# Patient Record
Sex: MALE | Race: WHITE | NOT HISPANIC OR LATINO | Employment: FULL TIME | URBAN - METROPOLITAN AREA
[De-identification: names, ages, dates, MRNs, and addresses within clinical notes are randomized per-mention and may not be internally consistent; named-entity substitution may affect disease eponyms.]

---

## 2022-01-11 NOTE — PROGRESS NOTES
SUBJECTIVE:   CC: Josse Yang is an 51 year old male who presents for preventative health visit.       Patient has been advised of split billing requirements and indicates understanding: Yes     Healthy Habits:     Getting at least 3 servings of Calcium per day:  Yes    Bi-annual eye exam:  Yes    Dental care twice a year:  NO    Sleep apnea or symptoms of sleep apnea:  None    Diet:  Breakfast skipped    Frequency of exercise:  None    Taking medications regularly:  Yes    Medication side effects:  None    PHQ-2 Total Score: 4    Additional concerns today:  Yes  - Varicose veins : had US at outside clinic.     - bump on the heel left heel     - lumps on the back 5 years ago , come and go  ? Lipoma     - skin tag on the right upper eye lid     - [place] blood pressure       Wt Readings from Last 4 Encounters:   01/14/22 113.4 kg (250 lb)   07/19/16 107.5 kg (237 lb)   05/04/16 104.3 kg (230 lb)   04/27/16 104.3 kg (230 lb)         Preventive -     Immunization History   Administered Date(s) Administered     COVID-19,PF,Marvel 04/06/2021     Influenza,INJ,MDCK,PF,Quad >4yrs 10/17/2020     Tdap (Adacel,Boostrix) 01/30/2012       -STD screen: declines    - Colon CA screen: Colonoscopy, age 50-75 every 10 years or FIT every year or Cologuard every 3 years       - Prostate CA screen: Discussed controversy about screening.   No results found for: PSA    - Hep C screen: 1 in 30 infected so check if born between 6775-0592 once     -lipids screen: ordered     Diabetes screen: ordered             Answers for HPI/ROS submitted by the patient on 1/14/2022  If you checked off any problems, how difficult have these problems made it for you to do your work, take care of things at home, or get along with other people?: Somewhat difficult  PHQ9 TOTAL SCORE: 7    SH:    Marital status:   Kids: 4 step kids   Employment:  [employer]  Exercise: no   Tobacco: no  Etoh: too much 5 days per week 5-7 drinks per  night   Recreational drugs: no           Today's PHQ-2 Score:   PHQ-2 ( 1999 Pfizer) 1/14/2022   Q1: Little interest or pleasure in doing things 2   Q2: Feeling down, depressed or hopeless 2   PHQ-2 Score 4   Q1: Little interest or pleasure in doing things More than half the days   Q2: Feeling down, depressed or hopeless More than half the days   PHQ-2 Score 4       Abuse: Current or Past(Physical, Sexual or Emotional)- No  Do you feel safe in your environment? Yes    Have you ever done Advance Care Planning? (For example, a Health Directive, POLST, or a discussion with a medical provider or your loved ones about your wishes): No, advance care planning information given to patient to review.  Patient declined advance care planning discussion at this time.    Social History     Tobacco Use     Smoking status: Never Smoker     Smokeless tobacco: Never Used   Substance Use Topics     Alcohol use: No     If you drink alcohol do you typically have >3 drinks per day or >7 drinks per week? Yes      Alcohol Use 1/14/2022   Prescreen: >3 drinks/day or >7 drinks/week? Yes   AUDIT SCORE  29     AUDIT - Alcohol Use Disorders Identification Test - Reproduced from the World Health Organization Audit 2001 (Second Edition) 1/14/2022   1.  How often do you have a drink containing alcohol? 4 or more times a week   2.  How many drinks containing alcohol do you have on a typical day when you are drinking? 5 or 6   3.  How often do you have five or more drinks on one occasion? Weekly   4.  How often during the last year have you found that you were not able to stop drinking once you had started? Weekly   5.  How often during the last year have you failed to do what was normally expected of you because of drinking? Weekly   6.  How often during the last year have you needed a first drink in the morning to get yourself going after a heavy drinking session? Never   7.  How often during the last year have you had a feeling of guilt or  remorse after drinking? Weekly   8.  How often during the last year have you been unable to remember what happened the night before because of your drinking? Weekly   9.  Have you or someone else been injured because of your drinking? Yes, during the last year   10. Has a relative, friend, doctor or other health care worker been concerned about your drinking or suggested you cut down? Yes, during the last year   TOTAL SCORE 29       Last PSA: No results found for: PSA    Reviewed orders with patient. Reviewed health maintenance and updated orders accordingly - Yes  Lab work is in process  BP Readings from Last 3 Encounters:   01/14/22 (!) 148/100   07/19/16 (!) 138/96   04/27/16 140/87    Wt Readings from Last 3 Encounters:   01/14/22 113.4 kg (250 lb)   07/19/16 107.5 kg (237 lb)   05/04/16 104.3 kg (230 lb)                  Patient Active Problem List   Diagnosis     CARDIOVASCULAR SCREENING; LDL GOAL LESS THAN 160     Alcohol dependence (H)     Past Surgical History:   Procedure Laterality Date     KNEE SURGERY         Social History     Tobacco Use     Smoking status: Never Smoker     Smokeless tobacco: Never Used   Substance Use Topics     Alcohol use: Yes     Alcohol/week: 25.0 standard drinks     Types: 25 Standard drinks or equivalent per week     Family History   Adopted: Yes   Problem Relation Age of Onset     Lupus Mother          Current Outpatient Medications   Medication Sig Dispense Refill     amLODIPine (NORVASC) 5 MG tablet Take 1 tablet (5 mg) by mouth daily 90 tablet 1     No Known Allergies  No lab results found.     Reviewed and updated as needed this visit by clinical staff  Tobacco  Allergies  Meds   Med Hx  Surg Hx  Fam Hx  Soc Hx       Reviewed and updated as needed this visit by Provider   Allergies  Meds   Med Hx  Surg Hx  Fam Hx  Soc Hx      History reviewed. No pertinent past medical history.   Past Surgical History:   Procedure Laterality Date     KNEE SURGERY         Review  "of Systems   Constitutional: Negative for chills and fever.   HENT: Positive for congestion. Negative for ear pain, hearing loss and sore throat.    Eyes: Positive for visual disturbance. Negative for pain.   Respiratory: Negative for cough and shortness of breath.    Cardiovascular: Negative for chest pain, palpitations and peripheral edema.   Gastrointestinal: Positive for diarrhea. Negative for abdominal pain, constipation, heartburn, hematochezia and nausea.   Genitourinary: Negative for dysuria, frequency, genital sores, hematuria and urgency.   Musculoskeletal: Negative for arthralgias, joint swelling and myalgias.   Skin: Negative for rash.   Neurological: Negative for dizziness, weakness, headaches and paresthesias.   Psychiatric/Behavioral: Positive for mood changes. The patient is nervous/anxious.          OBJECTIVE:   BP (!) 155/103 (BP Location: Left arm, Patient Position: Sitting, Cuff Size: Adult Large)   Pulse 75   Temp 97  F (36.1  C) (Tympanic)   Ht 1.905 m (6' 3\")   Wt 113.4 kg (250 lb)   SpO2 97%   BMI 31.25 kg/m      Physical Exam  Vitals and nursing note reviewed.   Constitutional:       General: He is not in acute distress.     Appearance: Normal appearance. He is not ill-appearing, toxic-appearing or diaphoretic.   HENT:      Head: Normocephalic and atraumatic.   Cardiovascular:      Rate and Rhythm: Normal rate and regular rhythm.      Pulses: Normal pulses.      Heart sounds: Normal heart sounds. No murmur heard.  No friction rub. No gallop.    Pulmonary:      Effort: Pulmonary effort is normal. No respiratory distress.      Breath sounds: Normal breath sounds. No stridor. No wheezing, rhonchi or rales.   Chest:      Chest wall: No tenderness.   Musculoskeletal:      Cervical back: Normal range of motion.        Back:    Skin:     Capillary Refill: Capillary refill takes less than 2 seconds.   Neurological:      Mental Status: He is alert.               ASSESSMENT/PLAN:   (Z00.00) " "Routine general medical examination at a health care facility  (primary encounter diagnosis)  Comment: Preventive care reviewed and updated   Plan: Lipid panel reflex to direct LDL Fasting,         Comprehensive metabolic panel (BMP + Alb, Alk         Phos, ALT, AST, Total. Bili, TP)    (I10) Benign essential hypertension  Comment: New diagnosis.  Blood pressure has been elevated over the past few years.  Will start Norvasc  Plan: amLODIPine (NORVASC) 5 MG tablet        Return in 4 weeks for blood pressure check    (F10.10) Alcohol abuse  Comment: Patient has been having a problem with alcohol.  Referral to addiction medicine  Plan: Adult Mental Health Referral            (Z23) High priority for 2019-nCoV vaccine  Comment:   Plan: COVID-19,PF,PFIZER (12+ Yrs PURPLE LABEL)            (Z12.11) Screen for colon cancer  Comment:   Plan: Adult Gastro Ref - Procedure Only            (Z11.4) Screening for HIV (human immunodeficiency virus)  Comment:   Plan: HIV Antigen Antibody Combo            (Z11.59) Need for hepatitis C screening test  Comment:   Plan: Hepatitis C Screen Reflex to HCV RNA Quant and         Genotype            (Z12.5) Screening for prostate cancer  Comment:   Plan: PSA, screen            (Z13.1) Screening for diabetes mellitus  Comment:   Plan: Hemoglobin A1c              Patient has been advised of split billing requirements and indicates understanding: Yes  COUNSELING:   Reviewed preventive health counseling, as reflected in patient instructions       Regular exercise       Healthy diet/nutrition       Alcohol Use        Syphilis screening for high risk patients        HIV screeninx in teen years, 1x in adult years, and at intervals if high risk       Colon cancer screening    Estimated body mass index is 31.25 kg/m  as calculated from the following:    Height as of this encounter: 1.905 m (6' 3\").    Weight as of this encounter: 113.4 kg (250 lb).     Weight management plan: Discussed healthy " diet and exercise guidelines    He reports that he has never smoked. He has never used smokeless tobacco.      Counseling Resources:  ATP IV Guidelines  Pooled Cohorts Equation Calculator  FRAX Risk Assessment  ICSI Preventive Guidelines  Dietary Guidelines for Americans, 2010  USDA's MyPlate  ASA Prophylaxis  Lung CA Screening    Jenny Desir MD  [facility]

## 2022-01-14 ENCOUNTER — OFFICE VISIT (OUTPATIENT)
Dept: FAMILY MEDICINE | Facility: CLINIC | Age: 52
End: 2022-01-14
Payer: COMMERCIAL

## 2022-01-14 VITALS
WEIGHT: 250 LBS | TEMPERATURE: 97 F | DIASTOLIC BLOOD PRESSURE: 100 MMHG | SYSTOLIC BLOOD PRESSURE: 148 MMHG | OXYGEN SATURATION: 97 % | BODY MASS INDEX: 31.08 KG/M2 | HEART RATE: 75 BPM | HEIGHT: 75 IN

## 2022-01-14 DIAGNOSIS — Z12.11 SCREEN FOR COLON CANCER: ICD-10-CM

## 2022-01-14 DIAGNOSIS — Z13.1 SCREENING FOR DIABETES MELLITUS: ICD-10-CM

## 2022-01-14 DIAGNOSIS — F10.10 ALCOHOL ABUSE: ICD-10-CM

## 2022-01-14 DIAGNOSIS — Z12.5 SCREENING FOR PROSTATE CANCER: ICD-10-CM

## 2022-01-14 DIAGNOSIS — I10 BENIGN ESSENTIAL HYPERTENSION: ICD-10-CM

## 2022-01-14 DIAGNOSIS — Z00.00 ROUTINE GENERAL MEDICAL EXAMINATION AT A HEALTH CARE FACILITY: Primary | ICD-10-CM

## 2022-01-14 DIAGNOSIS — Z23 HIGH PRIORITY FOR 2019-NCOV VACCINE: ICD-10-CM

## 2022-01-14 DIAGNOSIS — Z11.4 SCREENING FOR HIV (HUMAN IMMUNODEFICIENCY VIRUS): ICD-10-CM

## 2022-01-14 DIAGNOSIS — Z11.59 NEED FOR HEPATITIS C SCREENING TEST: ICD-10-CM

## 2022-01-14 PROBLEM — F10.20 ALCOHOL DEPENDENCE (H): Status: ACTIVE | Noted: 2022-01-14

## 2022-01-14 LAB
ALBUMIN SERPL-MCNC: 4.5 G/DL (ref 3.4–5)
ALP SERPL-CCNC: 77 U/L (ref 40–150)
ALT SERPL W P-5'-P-CCNC: 130 U/L (ref 0–70)
ANION GAP SERPL CALCULATED.3IONS-SCNC: 6 MMOL/L (ref 3–14)
AST SERPL W P-5'-P-CCNC: 47 U/L (ref 0–45)
BILIRUB SERPL-MCNC: 0.5 MG/DL (ref 0.2–1.3)
BUN SERPL-MCNC: 21 MG/DL (ref 7–30)
CALCIUM SERPL-MCNC: 9 MG/DL (ref 8.5–10.1)
CHLORIDE BLD-SCNC: 108 MMOL/L (ref 94–109)
CHOLEST SERPL-MCNC: 180 MG/DL
CO2 SERPL-SCNC: 25 MMOL/L (ref 20–32)
CREAT SERPL-MCNC: 0.92 MG/DL (ref 0.66–1.25)
FASTING STATUS PATIENT QL REPORTED: YES
GFR SERPL CREATININE-BSD FRML MDRD: >90 ML/MIN/1.73M2
GLUCOSE BLD-MCNC: 103 MG/DL (ref 70–99)
HBA1C MFR BLD: 5.3 % (ref 0–5.6)
HDLC SERPL-MCNC: 41 MG/DL
LDLC SERPL CALC-MCNC: 108 MG/DL
NONHDLC SERPL-MCNC: 139 MG/DL
POTASSIUM BLD-SCNC: 4.4 MMOL/L (ref 3.4–5.3)
PROT SERPL-MCNC: 7.8 G/DL (ref 6.8–8.8)
PSA SERPL-MCNC: 1.48 UG/L (ref 0–4)
SODIUM SERPL-SCNC: 139 MMOL/L (ref 133–144)
TRIGL SERPL-MCNC: 154 MG/DL

## 2022-01-14 PROCEDURE — 87389 HIV-1 AG W/HIV-1&-2 AB AG IA: CPT | Performed by: FAMILY MEDICINE

## 2022-01-14 PROCEDURE — 80061 LIPID PANEL: CPT | Performed by: FAMILY MEDICINE

## 2022-01-14 PROCEDURE — 91300 COVID-19,PF,PFIZER (12+ YRS): CPT | Performed by: FAMILY MEDICINE

## 2022-01-14 PROCEDURE — 36415 COLL VENOUS BLD VENIPUNCTURE: CPT | Performed by: FAMILY MEDICINE

## 2022-01-14 PROCEDURE — 99213 OFFICE O/P EST LOW 20 MIN: CPT | Mod: 25 | Performed by: FAMILY MEDICINE

## 2022-01-14 PROCEDURE — 0004A COVID-19,PF,PFIZER (12+ YRS): CPT | Performed by: FAMILY MEDICINE

## 2022-01-14 PROCEDURE — 86803 HEPATITIS C AB TEST: CPT | Performed by: FAMILY MEDICINE

## 2022-01-14 PROCEDURE — G0103 PSA SCREENING: HCPCS | Performed by: FAMILY MEDICINE

## 2022-01-14 PROCEDURE — 99386 PREV VISIT NEW AGE 40-64: CPT | Mod: 25 | Performed by: FAMILY MEDICINE

## 2022-01-14 PROCEDURE — 90682 RIV4 VACC RECOMBINANT DNA IM: CPT | Performed by: FAMILY MEDICINE

## 2022-01-14 PROCEDURE — 80053 COMPREHEN METABOLIC PANEL: CPT | Performed by: FAMILY MEDICINE

## 2022-01-14 PROCEDURE — 90471 IMMUNIZATION ADMIN: CPT | Performed by: FAMILY MEDICINE

## 2022-01-14 PROCEDURE — 83036 HEMOGLOBIN GLYCOSYLATED A1C: CPT | Performed by: FAMILY MEDICINE

## 2022-01-14 RX ORDER — AMLODIPINE BESYLATE 5 MG/1
5 TABLET ORAL DAILY
Qty: 90 TABLET | Refills: 1 | Status: SHIPPED | OUTPATIENT
Start: 2022-01-14 | End: 2022-07-11

## 2022-01-14 ASSESSMENT — ENCOUNTER SYMPTOMS
CHILLS: 0
NAUSEA: 0
WEAKNESS: 0
DYSURIA: 0
PALPITATIONS: 0
HEMATURIA: 0
JOINT SWELLING: 0
HEMATOCHEZIA: 0
CONSTIPATION: 0
SHORTNESS OF BREATH: 0
MYALGIAS: 0
FEVER: 0
ARTHRALGIAS: 0
DIARRHEA: 1
FREQUENCY: 0
COUGH: 0
HEADACHES: 0
DIZZINESS: 0
HEARTBURN: 0
NERVOUS/ANXIOUS: 1
SORE THROAT: 0
EYE PAIN: 0
PARESTHESIAS: 0
ABDOMINAL PAIN: 0

## 2022-01-14 ASSESSMENT — PAIN SCALES - GENERAL: PAINLEVEL: NO PAIN (0)

## 2022-01-14 ASSESSMENT — PATIENT HEALTH QUESTIONNAIRE - PHQ9
SUM OF ALL RESPONSES TO PHQ QUESTIONS 1-9: 7
SUM OF ALL RESPONSES TO PHQ QUESTIONS 1-9: 7
10. IF YOU CHECKED OFF ANY PROBLEMS, HOW DIFFICULT HAVE THESE PROBLEMS MADE IT FOR YOU TO DO YOUR WORK, TAKE CARE OF THINGS AT HOME, OR GET ALONG WITH OTHER PEOPLE: SOMEWHAT DIFFICULT

## 2022-01-14 ASSESSMENT — MIFFLIN-ST. JEOR: SCORE: 2074.62

## 2022-01-15 ASSESSMENT — PATIENT HEALTH QUESTIONNAIRE - PHQ9: SUM OF ALL RESPONSES TO PHQ QUESTIONS 1-9: 7

## 2022-01-17 LAB
HCV AB SERPL QL IA: NONREACTIVE
HIV 1+2 AB+HIV1 P24 AG SERPL QL IA: NONREACTIVE

## 2022-01-24 ENCOUNTER — VIRTUAL VISIT (OUTPATIENT)
Dept: BEHAVIORAL HEALTH | Facility: CLINIC | Age: 52
End: 2022-01-24
Attending: FAMILY MEDICINE
Payer: COMMERCIAL

## 2022-01-24 DIAGNOSIS — F52.9 SEXUAL BEHAVIOR DISORDER: ICD-10-CM

## 2022-01-24 DIAGNOSIS — F10.10 ALCOHOL ABUSE: ICD-10-CM

## 2022-01-24 DIAGNOSIS — F32.A DEPRESSION, UNSPECIFIED DEPRESSION TYPE: Primary | ICD-10-CM

## 2022-01-24 DIAGNOSIS — F10.20 ALCOHOL USE DISORDER, SEVERE, DEPENDENCE (H): ICD-10-CM

## 2022-01-24 RX ORDER — NALTREXONE HYDROCHLORIDE 50 MG/1
50 TABLET, FILM COATED ORAL DAILY
Qty: 50 TABLET | Refills: 0 | Status: SHIPPED | OUTPATIENT
Start: 2022-01-24 | End: 2022-03-07

## 2022-01-24 ASSESSMENT — ANXIETY QUESTIONNAIRES
GAD7 TOTAL SCORE: 7
3. WORRYING TOO MUCH ABOUT DIFFERENT THINGS: SEVERAL DAYS
7. FEELING AFRAID AS IF SOMETHING AWFUL MIGHT HAPPEN: MORE THAN HALF THE DAYS
2. NOT BEING ABLE TO STOP OR CONTROL WORRYING: MORE THAN HALF THE DAYS
GAD7 TOTAL SCORE: 7
7. FEELING AFRAID AS IF SOMETHING AWFUL MIGHT HAPPEN: MORE THAN HALF THE DAYS
6. BECOMING EASILY ANNOYED OR IRRITABLE: SEVERAL DAYS
5. BEING SO RESTLESS THAT IT IS HARD TO SIT STILL: NOT AT ALL
GAD7 TOTAL SCORE: 7
4. TROUBLE RELAXING: NOT AT ALL
1. FEELING NERVOUS, ANXIOUS, OR ON EDGE: SEVERAL DAYS

## 2022-01-24 NOTE — PATIENT INSTRUCTIONS
It was so nice to meet you today, Josse!    1. Start Naltrexone 25 mg daily for 1 week, then take 50 mg daily.    2. Start sertraline (Zoloft) 50 mg daily.    3. One of our team members will contact you via phone to help you schedule a chemical health assessment, and for the compulsive sexual counseling group in the next 1-2 days. If you have not heard from them you may call [phone] to schedule. Both referrals have been placed.

## 2022-01-24 NOTE — NURSING NOTE
This video/telephone visit will be conducted via a call between you and your physician/provider. We have found that certain health care needs can be provided without the need for an in-person physical exam. This service lets us provide the care you need with a video /telephone conversation. If a prescription is necessary we can send it directly to your pharmacy. If lab work is needed we can place an order for that and you can then stop by our lab to have the test done at a later time.    Just as we bill insurance for in-person visits, we also bill insurance for video/telephone visits. If you have questions about your insurance coverage, we recommend that you speak with your insurance company.    Patient has given verbal consent for video/Telephone visit? yes    Patient would like video visit, please connect : [employer]  Reason for visit: ETOH overuse  Patient verified allergies, medications and pharmacy via [employer]    PHQ -9 score: 7 ( on 1/14/21 - pt reports no change)  AARON-7 score: 7    Patient states he  is ready for visit.    Kena Phoenix January 24, 2022 2:33 PM      Medications Phoned  to Pharmacy [] yes [x]no  Name of Pharmacist:  List Medications, including dose, quantity and instructions     Medications ordered this visit were e-scribed.  Verified by order class [x] yes  [] no  Depakote and Zoloft  Medication changes or discontinuations were communicated to patient's pharmacy: [] yes  [x] no     Dictation completed at time of chart check: [x] yes  [] no     I have checked the documentation for today s encounters and the above information has been reviewed and completed.        Kena Phoenix January 26, 2022 10:57 AM

## 2022-01-24 NOTE — PROGRESS NOTES
"[facility] ADDICTION MEDICINE  INITIAL VISIT                                                      SUBJECTIVE                                                    CC: Patient presents with:  Intake      Primary care provider: [facility]   Psychiatric provider or therapist:     Visit performed Virtual, via video  Video-Visit Details    Type of service:  Video Visit    Video Start Time: 2:30pm  Video End Time: 3:39 PM    Originating Location (pt. Location): Home    Distant Location (provider location):  [place] ADDICTION MEDICINE     Platform used for Video Visit: Faye      HPI:    Josse Yang is a 51 year old male with a history of Hypertension, depression, and alcohol dependence who presents for initial visit for addiction consultation and management referred by Dr Jenny Desir due to concerns for Alcohol Use Disorder (AUD)    Joses is seeking help with his alcohol use disorder and pornography. This is his first time seeking help. He states he began using alcohol at the age of 17, and started looking at pornography at the age of 8. He has been struggling with his mental health and alcohol use his whole life. He states he feels that depression has played a significant role his whole life.  Engaging in pornography helped him cope growing up, and is ongoing.  He states that he was adopted when he, and he grew up in a loving strict home. He began drinking heavily while in college. Drinking is a part of all his activities/hobbies and both alcohol and pornography are taking a toll on his marriage. He endorses drinking 6-12 hard seltzers and 3 shots of vodka 5 times weekly. He takes a \"break\" from alcohol after he has a day with a significant hangover to re-cooperate. He has spent a significant amount of time fine-tuning his alcohol use to avoid trouble/getting caught. He describes himself as a \"mean drunk\", and is verbally abusive to wife at times while intoxicated.  He has been working from home since " "March 2020, and since then he alcohol consumption and engaging in pornography as  has increased, and depression symptoms have worsened.  He also has elevated liver enzymes and new diagnosis of hypertension which are also motivators for him seeking treatment today. He denies any other substance use. He would like a referral for chemical health assessment and possible CD treatment, and is open to a trial of medications for cravings.     He has attended different groups in past for his sexual urges, but has no found them particularly helpful. States he often had urges to engage in behaviors after listening to others. He would like  a referral for therapy individual/group.    CD History:     SUBSTANCE(S)  OF CHOICE  - Alcohol started 17, drinks 6-12 seltzer drinks, 3 shots of vodka.    Stops for hangover. Drinks in early afternoon, evening.   Withdrawal symptoms - Physically sick, depression, anxiety.   IV drug use - No  Previous MAT - No  Previous detox/treatment - No  Current recovery activities: Reading book, you tube videos,   Longest period of sobriety - 3 days   Significant protective factors: Relationship, \"mean drunk\" verbally abusive at times when drinking  Medical complications from substance use- High blood pressure, elevated liver enzymes, not eating healthy            and not exercise.   History of overdose -No  Narcan currently available - No    Other Substance Use:  ALCOHOL  Per HPI  OPIATES:   None  KRATOM:  None  BENZODIAZEPINES   None  AMPHETAMINES/METHAMPHETAMINES  None  PRESCRIPTION STIMULANTS None  MARIJUANA  None  COCAINE/CRACK  None  HALLUCINOGENS   None  ANABOLIC STEROIDS  None   OTHER  (Gambling, shopping) Pornography  NICOTINE- No        Infectious Disease Screening:  Hep C: Non-reactive 1/14/2022  HIV: Non-reactive 1/14/2022      PAST PSYCHIATRIC HISTORY  Past diagnoses - Suffered from depression child childhood, adopted as a child. [place] parents strict. Her was class clown, life of party.  " Started drinking when he left [facility]. Pornography boosted his mood as an adolescent, engages daily. Cant stop once he starts, as well as alcohol.   Current or past psychiatrist: No  Current or past therapist: IN the past, counselor as a teenager for his anger issues. Relationship counseling. Pornography counseling. Tired the purity group at [employer], triggered at group.   Hospitalizations/commitment - No  Suicide Attempts - No  Psychotherapy/ECT/TMS -   Medication trials - Zoloft did not give it a fair shot. Took for 30 days.     PHQ-9 scores:  PHQ-9 SCORE 1/14/2022   PHQ-9 Total Score MyChart 7 (Mild depression)   PHQ-9 Total Score 7     AARON-7 scores:  AARON-7 SCORE 1/24/2022   Total Score 7 (mild anxiety)   Total Score 7       MEDICAL HISTORY:  Problem list, allergies, and histories reviewed & adjusted, as indicated.  Additional history: as documented     Patient Active Problem List    Diagnosis Date Noted     Alcohol abuse 01/24/2022     Priority: Medium     Depression, unspecified depression type 01/24/2022     Priority: Medium     Sexual behavior disorder 01/24/2022     Priority: Medium     Alcohol dependence (H) 01/14/2022     Priority: Medium     CARDIOVASCULAR SCREENING; LDL GOAL LESS THAN 160 04/27/2016     Priority: Medium       No past medical history on file.    Past Surgical History:   Procedure Laterality Date     KNEE SURGERY         Family History   Adopted: Yes   Problem Relation Age of Onset     Lupus Mother        SOCIAL HISTORY:    Living situation:  With wife   Single///partner    Children 4 children, 4 grandbabies   Support system: Wife, coworkers    Employment:    Barriers to Care (transportation, childcare, etc): None   Upcoming Court Date(s): No   Consent to Communicate? NA       ALLERGIES & CURRENT MEDICATIONS:  No Known Allergies    Current Outpatient Medications   Medication Sig Dispense Refill     naltrexone (DEPADE/REVIA) 50 MG tablet Take 1  tablet (50 mg) by mouth daily Take 1/2 tablet daily for 1 week, then take tablet daily. 50 tablet 0     sertraline (ZOLOFT) 50 MG tablet Take 1 tablet (50 mg) by mouth daily 30 tablet 0     amLODIPine (NORVASC) 5 MG tablet Take 1 tablet (5 mg) by mouth daily 90 tablet 1       REVIEW OF SYSTEMS:  General:  acute withdrawal symptoms.  No recent infections or fever  Eyes:  No vision concerns.  No double vision.    Resp: No coughing, wheezing or shortness of breath  CV: No chest pains or palpitations  GI: No nausea, vomiting, abdominal pain, diarrhea.  No constipation  : No urinary frequency or dysuria    Musculoskeletal: No significant muscle or joint pains other than as above.  No edema  Neurologic: No numbness, tingling, weakness, problems with balance or coordination  Psychiatric: No acute concerns other than as above. See mental status exam for more information.   Skin: No rashes or areas of acute infection    OBJECTIVE                                                      LABS:  Labs reviewed.  Pertinent data include:       No results found for any visits on 01/24/22.  AST   Date Value Ref Range Status   01/14/2022 47 (H) 0 - 45 U/L Final     ALT   Date Value Ref Range Status   01/14/2022 130 (H) 0 - 70 U/L Final       PHYSICAL EXAM:  There were no vitals taken for this visit.    GENERAL APPEARANCE: alert, comfortable appearing  EYES: Eyes grossly normal to inspection  HENT:  mouth without ulcers or lesions, nose no rhinorrhea  RESP: No cough, no resp distress  SKIN: no rashes, no jaundice, no obvious masses.   NEURO: Alert and oriented x4    MENTAL STATUS EXAM:  Appearance/Behavior:No appearant distress  Speech: Normal  Mood/Affect: normal affect  Insight: Adequate      [place] Board of Pharmacy Data Base Reviewed.  Is Consistent with patient reports and Epic records.    ASSESSMENT/PLAN                                                      DSM-5 Substance Use Disorder Criteria: At least two criteria must be  met within a twelve month period.    Impaired Control:    1. Substance is taken in larger amounts or over a longer period than was intended. Yes   2. There is a persistent desire or unsuccessful efforts to cut down or control use. Yes     3. A great deal of time is spent in activities necessary to obtain substance, use substance, or recover from its effects. Yes   4. Craving, or a strong desire or urge to use the substance. Yes      Social Impairment:    5. Recurrent substance use resulting in failure to fulfill major role obligations at work, school or home. Yes  6. Continued substance use despite having persistent or recurrent social or interpersonal problems caused or exacerbated by the effects of the substance. Yes    7.Important social, occupational, or recreational activities are given up or reduced because of the substance use.  No      Risky Use:   8. Recurrent substance use in situations in which it is physically hazardous. Yes    9. Substance use is continued despite knowledge of having a persistent or recurrent physical or psychological problem that is likely to have been caused or exacerbated by the substance. Yes      Pharmacological:  10. Tolerance, as defined by either of the following: Yes     a. A need for markedly increased amounts of the substance to achieve intoxication or desired effect.   b. A markedly diminished effect with continued use of the same amount of the substance.  11. Withdrawal, as manifested by either of the following: Yes     a. The characteristic withdrawal syndrome for the substance.   b. Substance (or a closely related substance) is taken to relieve or avoid withdrawal symptoms.     Mild: Presence of 2-3 symptoms  Moderate: Presence of 4-5 symptoms  Severe: Presence of 6 or more symptoms.    Specify if :  In early remission - no criteria met (except craving) for at least 3 months and less than 12 months  In sustained remission - no criteria met (except craving) for 12 months  or longer.    In a controlled environment - individual is in an environment where access to the substance is restricted.    Pt met 10 of 11 criteria for a Alcohol  use disorder, Severe    ASSESSMENT/PLAN:  Josse was seen today for intake.    Diagnoses and all orders for this visit:    Depression, unspecified depression type  Long history of depression symptoms since childhood. Did trial sertraline for 1 month years ago, but he states he does not think he felt differently.  He  reports drinking while taking medication. He denies any significant side effects of the medications. He is open to another trial of medication. Will start sertraline 50 mg daily.  -     sertraline (ZOLOFT) 50 MG tablet; Take 1 tablet (50 mg) by mouth daily    Alcohol Use Disorder, Severe  Referral for CD assessment and possible IOP placed. Naltrexone ordered. Discussed possibility of gabapentin. Would like to try the naltrexone at this time, consider gabapentin in future if cravings/use persist.    -     Adult Mental Health Referral  -     Adult Mental Health  Referral; Future  -     naltrexone (DEPADE/REVIA) 50 MG tablet; Take 1 tablet (50 mg) by mouth daily Take 1/2 tablet daily for 1 week, then take tablet daily.    Sexual behavior disorder  Engages in pornography daily, impacting his relationships. Referral placed for compulsive sexual therapy.    -     Adult Mental Health  Referral; Future       ENCOUNTER FOR LONG TERM USE OF HIGH RISK MEDICATION   High Risk Drug Monitoring?  YES   Drug being monitored: Naltrexone   Reason for drug: Alcohol Use Disorder   What is being monitored?: Dosage, Cravings, Triggers and side effects      Patient counseling completed today:    Addiction Services expectations were reviewed and a copy was provided to patient at the completion of today's visit.  The expectations addressed include; routine urine drug screens, frequency of office visits, cancellations/no-shows, and clinic contact  information.  The patient was notified that our clinic has 72 business hours to complete any forms and a minimum of 24 business hours to address any medication refill requests.  Questions regarding these expectations were answered and the patient verbalizes an understanding and acceptance of the above expectations.         Counseled the patient on the importance of having a recovery program in addition to medication assisted treatment (MAT).  Components of a recovery program include having some type of sober network, avoiding isolating, willingness to change, avoiding triggers, and managing cravings.      RTC:  1 week      Penelope Villalobos [facility] Addiction Medicine  [facility]  [phone]              Answers for HPI/ROS submitted by the patient on 1/24/2022  AARON 7 TOTAL SCORE: 7

## 2022-01-25 ASSESSMENT — ANXIETY QUESTIONNAIRES: GAD7 TOTAL SCORE: 7

## 2022-01-31 ENCOUNTER — VIRTUAL VISIT (OUTPATIENT)
Dept: BEHAVIORAL HEALTH | Facility: CLINIC | Age: 52
End: 2022-01-31
Payer: COMMERCIAL

## 2022-01-31 DIAGNOSIS — F10.21 ALCOHOL USE DISORDER, SEVERE, IN EARLY REMISSION (H): ICD-10-CM

## 2022-01-31 DIAGNOSIS — F32.A DEPRESSION, UNSPECIFIED DEPRESSION TYPE: ICD-10-CM

## 2022-01-31 DIAGNOSIS — F52.9 SEXUAL BEHAVIOR DISORDER: Primary | ICD-10-CM

## 2022-01-31 PROCEDURE — 99214 OFFICE O/P EST MOD 30 MIN: CPT | Mod: 95 | Performed by: NURSE PRACTITIONER

## 2022-01-31 RX ORDER — NALTREXONE HYDROCHLORIDE 50 MG/1
50 TABLET, FILM COATED ORAL DAILY
Refills: 0 | Status: CANCELLED | OUTPATIENT
Start: 2022-01-31

## 2022-01-31 NOTE — PROGRESS NOTES
[facility] Addiction Medicine    A/P                                                    ASSESSMENT/PLAN  Diagnoses and all orders for this visit:  Sexual behavior disorder  Engaged in behavior once in past week. Referral placed to compulsive sex therapy group, has not heard from behavioral access to schedule intake. Phone number given to Josse, he will call to get scheduled.   Depression, unspecified depression type  Started sertraline 50mg daily, mood is good.  Continue sertraline as ordered.   Alcohol use disorder, severe, in early remission (H)  -Started the naltrexone 25 mg daily. Did experience some side effects the first 3 days, they resolving. Will continue to take 25mg for another week, then increase to 50 mg daily. He denies cravings, and is enjoying waking up not feeling hung over.   -Josse will call behavioral access to schedule a chemical health assessment.  -Experienced withdrawal symptoms on day 4, headache, fatigue, decreased appetite, nausea. Symptoms are improving. Offered gabapentin for withdrawal, he declined.   - He recognizes the need to avoid people and places.    No orders of the defined types were placed in this encounter.      Problem list updated Jan 31, 2022   Problem   Alcohol Use Disorder, Severe, in Early Remission (H)         PDMP Review       Value Time User    State PDMP site checked  Yes 1/31/2022  3:06 PM Penelope Villalobos CNP          RTC  Return in about 1 week (around 2/7/2022), or Please schedule him at 3:30 pm for a follow up, for Follow up, using a video visit, with me.      Counseled the patient on the importance of having a recovery program in addition to medication to manage recovery.  Components include avoiding isolating, having willingness to change, avoiding triggers and managing cravings. Encouraged having some type of sober network and practicing honesty with trusted support person(s). Encouraged other services such as counseling, 12 step or other  "self-help organizations.        SUBJECTIVE                                                    Josse Yang is a 51 year old male who presents to clinic today for follow up    Visit performed Virtual, via video    Video-Visit Details    Type of service:  Video Visit    Video Start Time: 2:30 pm  Video End Time: 2:54 PM    Originating Location (pt. Location): Home    Distant Location (provider location):  [place] ADDICTION MEDICINE     Platform used for Video Visit: Faye Bergeron [facility] Details: 1/24/22  Josse Yang is a 51 year old male with a history of Hypertension, depression, and alcohol dependence who presents for initial visit for addiction consultation and management referred by Dr Jenny Desir due to concerns for Alcohol Use Disorder (AUD)     Josse is seeking help with his alcohol use disorder and pornography. This is his first time seeking help. He states he began using alcohol at the age of 17, and started looking at pornography at the age of 8. He has been struggling with his mental health and alcohol use his whole life. He states he feels that depression has played a significant role his whole life.  Engaging in pornography helped him cope growing up, and is ongoing.  He states that he was adopted when he, and he grew up in a loving strict home. He began drinking heavily while in college. Drinking is a part of all his activities/hobbies and both alcohol and pornography are taking a toll on his marriage. He endorses drinking 6-12 hard seltzers and 3 shots of vodka 5 times weekly. He takes a \"break\" from alcohol after he has a day with a significant hangover to re-cooperate. He has spent a significant amount of time fine-tuning his alcohol use to avoid trouble/getting caught. He describes himself as a \"mean drunk\", and is verbally abusive to wife at times while intoxicated.  He has been working from home since March 2020, and since then he alcohol consumption and engaging in pornography as  has " increased, and depression symptoms have worsened.  He also has elevated liver enzymes and new diagnosis of hypertension which are also motivators for him seeking treatment today. He denies any other substance use. He would like a referral for chemical health assessment and possible CD treatment, and is open to a trial of medications for cravings.      He has attended different groups in past for his sexual urges, but has no found them particularly helpful. States he often had urges to engage in behaviors after listening to others. He would like  a referral for therapy individual/group.  Depression, unspecified depression type  Long history of depression symptoms since childhood. Did trial sertraline for 1 month years ago, but he states he does not think he felt differently.  He  reports drinking while taking medication. He denies any significant side effects of the medications. He is open to another trial of medication. Will start sertraline 50 mg daily.  -     sertraline (ZOLOFT) 50 MG tablet; Take 1 tablet (50 mg) by mouth daily     Alcohol Use Disorder, Severe  Referral for CD assessment and possible IOP placed. Naltrexone ordered. Discussed possibility of gabapentin. Would like to try the naltrexone at this time, consider gabapentin in future if cravings/use persist.    -     Adult Mental Health Referral  -     Adult Mental Health  Referral; Future  -     naltrexone (DEPADE/REVIA) 50 MG tablet; Take 1 tablet (50 mg) by mouth daily Take 1/2 tablet daily for 1 week, then take tablet daily.     Sexual behavior disorder  Engages in pornography daily, impacting his relationships. Referral placed for compulsive sexual therapy.    -     Adult Mental Health  Referral; Future     TODAY'S VISIT  HPI Jan 31, 2022   Josse is being seen today for a follow up for the treatment of his  AUD.   - He reports that it has been easier than he thought, has not drank this past week.   -Went to wild game last Tuesday  "which was the last time he drank.   -Took naltrexone  on Tuesday and Wednesday 25 mg,  took a full dose on Thursday by accident amd xperienced some side effects. \"felt like flying high\"  -Denies cravings which is surprising to him. Many food cravings, comfort food.   -Sleeping more.   -Experienced some headache, nausea, decreased appetite. Feeling okay today.  -Enjoys waking up with out feeling hung over.   -Masturbation once in last week, stress is down.   -has not been contacted yet to schedule.   Has a lot of hope.   -Thinking about the future.  - -Substance use since last visit: 6 days sober.   -Cravings: No   -Triggers: Avoiding people, places  -Mood: Good. Fatigued  -Sleep: Sleeping well  -Spent time with wife working on kitchen.       OBJECTIVE                                                    PHYSICAL EXAM:  There were no vitals taken for this visit.    GENERAL: healthy, alert and no distress  EYES: Eyes grossly normal to inspection, PERRL and conjunctivae and sclerae normal  RESP: No respiratory distress  MENTAL STATUS EXAM  Appearance/Behavior: No appearant distress  Speech: Normal  Mood/Affect: normal affect  Insight: Adequate    LAB  No results found for any visits on 01/31/22.      HISTORY                                                    Problem list reviewed & adjusted, as indicated.  Patient Active Problem List   Diagnosis     CARDIOVASCULAR SCREENING; LDL GOAL LESS THAN 160     Alcohol dependence (H)     Alcohol use disorder, severe, in early remission (H)     Depression, unspecified depression type     Sexual behavior disorder         MEDICATION LIST (prior to visit)  amLODIPine (NORVASC) 5 MG tablet, Take 1 tablet (5 mg) by mouth daily  naltrexone (DEPADE/REVIA) 50 MG tablet, Take 1 tablet (50 mg) by mouth daily Take 1/2 tablet daily for 1 week, then take tablet daily.  sertraline (ZOLOFT) 50 MG tablet, Take 1 tablet (50 mg) by mouth daily    No current facility-administered medications on file " prior to visit.      MEDICATION LIST (after visit)  Current Outpatient Medications   Medication     amLODIPine (NORVASC) 5 MG tablet     naltrexone (DEPADE/REVIA) 50 MG tablet     sertraline (ZOLOFT) 50 MG tablet     No current facility-administered medications for this visit.         No Known Allergies    Additional MDM Details:  none    Penelope Villalobos [facility] Addiction Medicine  [facility]  [phone]

## 2022-01-31 NOTE — PROGRESS NOTES
This video/telephone visit will be conducted via a call between you and your physician/provider. We have found that certain health care needs can be provided without the need for an in-person physical exam. This service lets us provide the care you need with a video /telephone conversation. If a prescription is necessary we can send it directly to your pharmacy. If lab work is needed we can place an order for that and you can then stop by our lab to have the test done at a later time.    Just as we bill insurance for in-person visits, we also bill insurance for video/telephone visits. If you have questions about your insurance coverage, we recommend that you speak with your insurance company.    Patient has given verbal consent for video/Telephone visit? Yes    Patient would like video visit, please connect : [place], if unable to connect call [phone].  Reason for visit: Medication follow up   Patient verified allergies, medications and pharmacy via telephone.     AARON-7 scores:    AARON-7 SCORE 1/24/2022   Total Score 7 (mild anxiety)   Total Score 7       PHQ-9 scores:   PHQ-9 SCORE 1/14/2022   PHQ-9 Total Score Dannyhart 7 (Mild depression)   PHQ-9 Total Score 7     Patient states he  is ready for visit.  [place]  to be reviewed by provider.     Jazmin Tapia CMA January 31, 2022 2:07 PM

## 2022-01-31 NOTE — PATIENT INSTRUCTIONS
It was great to see you today, and hearing about your successes!    1. Continue to take Naltrexobe 25 mg for another week, then increase to 50 mg daily.    2. Call Behavioral access at [phone] to schedule a chemical health assessment and group intake.     See you in a week. Reach if needed with questions or concerns.

## 2022-02-11 ENCOUNTER — VIRTUAL VISIT (OUTPATIENT)
Dept: BEHAVIORAL HEALTH | Facility: CLINIC | Age: 52
End: 2022-02-11
Attending: FAMILY MEDICINE
Payer: COMMERCIAL

## 2022-02-11 VITALS — WEIGHT: 245 LBS | BODY MASS INDEX: 30.62 KG/M2

## 2022-02-11 DIAGNOSIS — F10.21 ALCOHOL USE DISORDER, SEVERE, IN EARLY REMISSION (H): Primary | ICD-10-CM

## 2022-02-11 DIAGNOSIS — F52.9 SEXUAL BEHAVIOR DISORDER: ICD-10-CM

## 2022-02-11 DIAGNOSIS — F32.A DEPRESSION, UNSPECIFIED DEPRESSION TYPE: ICD-10-CM

## 2022-02-11 PROCEDURE — 99215 OFFICE O/P EST HI 40 MIN: CPT | Mod: 95 | Performed by: NURSE PRACTITIONER

## 2022-02-11 NOTE — PROGRESS NOTES
[facility] Addiction Medicine    A/P                                                    ASSESSMENT/PLAN  Diagnoses and all orders for this visit:  Josse is doing well avoiding alcohol. He is not having any cravings. His mood is deflated from 2 weeks ago Reminded him that inearly recovery, mood vacillates between high and low moods. He is worried he is substituting his alcohol use with food, escaping with his IPAD, He has low motivation to to much of anything. States when he drank he was constantly doing projects around the house, and now he has no desire. Encouraged him to make  List of tasks he needs to get done both at work and home and slowly start doing tasks to avoid feeling overwhelmed and ultimately increase his anxity and depression. He plans to set an intention to do one task per day. He is napping during the day, and not sleeping well at night which may contribute to his feeling tired. Encouraged avoiding naps, and take melatonin at night.   Alcohol use disorder, severe, in early remission (H)  -Continue Naltrexone 50 mg daily. No refills needed.   Depression, unspecified depression type  -Continue sertraline 50 mg daily. Consider increasing dose to 100 mg if no improvement, or changing to bupropion for its energizing effects.   Sexual behavior disorder  -He is doing well, has look at pornography 3 times in 2 weeks, but really really has no urges.   -Continue Naltrexone.   No orders of the defined types were placed in this encounter.      Problem list updated Feb 11, 2022   No problems updated.      PDMP Review       Value Time User    State PDMP site checked  Yes 1/31/2022  3:06 PM Penelope Villalobos CNP          RTC  Return in about 2 weeks (around 2/25/2022), or 3:30pm patient aware, for Follow up, using a video visit, with me.      Counseled the patient on the importance of having a recovery program in addition to medication to manage recovery.  Components include avoiding isolating, having  "willingness to change, avoiding triggers and managing cravings. Encouraged having some type of sober network and practicing honesty with trusted support person(s). Encouraged other services such as counseling, 12 step or other self-help organizations.      Opioid warning reviewed.  Risk of overdose following a period of abstinence due to decrease tolerance was discussed including risk of death.  Strongly recommended abstain from alcohol, benzodiazepines, THC, opioids and other drugs of abuse.  Increased risk of return to opioid use after use of these substances discussed.  Increased risk of overdose/death with use of other substances particularly benzodiazepines/alcohol reviewed.        SUBJECTIVE                                                    Josse Yang is a 51 year old male who presents to clinic today for follow up    Visit performed Virtual, via video    Video-Visit Details    Type of service:  Video Visit    Video Start Time: 3:30pm  Video End Time: 4:23 PM    Originating Location (pt. Location): Home    Distant Location (provider location):  [facility] [employer] [facility]     Platform used for Video Visit: Faye Bergeron HPI Details: 1/31/22  Josse is being seen today for a follow up for the treatment of his  AUD.   - He reports that it has been easier than he thought, has not drank this past week.   -Went to wild game last Tuesday which was the last time he drank.   -Took naltrexone  on Tuesday and Wednesday 25 mg,  took a full dose on Thursday by accident amd xperienced some side effects. \"felt like flying high\"  -Denies cravings which is surprising to him. Many food cravings, comfort food.   -Sleeping more.   -Experienced some headache, nausea, decreased appetite. Feeling okay today.  -Enjoys waking up with out feeling hung over.   -Masturbation once in last week, stress is down.   -has not been contacted yet to schedule.   Has a lot of hope.   -Thinking about the future.  - -Substance use since last " visit: 6 days sober.   -Cravings: No   -Triggers: Avoiding people, places  -Mood: Good. Fatigued  -Sleep: Sleeping well  -Spent time with wife working on kitchen.      Sexual behavior disorder  Engaged in behavior once in past week. Referral placed to compulsive sex therapy group, has not heard from behavioral access to schedule intake. Phone number given to Josse, he will call to get scheduled.   Depression, unspecified depression type  Started sertraline 50mg daily, mood is good.  Continue sertraline as ordered.   Alcohol use disorder, severe, in early remission (H)  -Started the naltrexone 25 mg daily. Did experience some side effects the first 3 days, they resolving. Will continue to take 25mg for another week, then increase to 50 mg daily. He denies cravings, and is enjoying waking up not feeling hung over.   -Josse will call behavioral access to schedule a chemical health assessment.  -Experienced withdrawal symptoms on day 4, headache, fatigue, decreased appetite, nausea. Symptoms are improving. Offered gabapentin for withdrawal, he declined.   - He recognizes the need to avoid people and places.    No orders of the defined types were placed in this encounter.    TODAY'S VISIT  HPI Feb 11, 2022   Josse is 52 years old with history for hypertension, anxiety, and depression is being seen today for a follow up for AUD. He is taking sertraline 50 mg daily and naltrexone 50 mg daily.     - He is still not drinking. He denies cravings. States he has no desire to drink. He is surprised by his lack of desire, and questions why he doesn't.   -He is experiencing low motivation, and feeling tired much of the day. Takes naps.   -When he gets off of work he does not want to do anything. Wife is feeling frustrated.    -Promoted to manager role, is feeling overwhelmed and stressed. Working long hours  -Feels overwhelmed by many tasks he needs to complete. He procrastinates doing tasks then becomes overwhelmed.   - food  "cravings  - escaping with IPAD for 3 hours in evening. Is worries he is replacing the alcohol with other things due to his \"addictive nature\".   -pornography 3 times urges decreasing and alcohol urge decrease.    -Is worried about going to [place] in April.   -Sleeping not well, encouraged melatonin, avoid naps during the day.           OBJECTIVE                                                    PHYSICAL EXAM:  Wt 111.1 kg (245 lb)   BMI 30.62 kg/m      GENERAL: healthy, alert and no distress  EYES: Eyes grossly normal to inspection, PERRL and conjunctivae and sclerae normal  RESP: No respiratory distress  MENTAL STATUS EXAM  Appearance/Behavior: No appearant distress  Speech: Normal  Mood/Affect: normal affect  Insight: Adequate    LAB  No results found for any visits on 02/11/22.      HISTORY                                                    Problem list reviewed & adjusted, as indicated.  Patient Active Problem List   Diagnosis     CARDIOVASCULAR SCREENING; LDL GOAL LESS THAN 160     Alcohol dependence (H)     Alcohol use disorder, severe, in early remission (H)     Depression, unspecified depression type     Sexual behavior disorder         MEDICATION LIST (prior to visit)  amLODIPine (NORVASC) 5 MG tablet, Take 1 tablet (5 mg) by mouth daily  naltrexone (DEPADE/REVIA) 50 MG tablet, Take 1 tablet (50 mg) by mouth daily Take 1/2 tablet daily for 1 week, then take tablet daily.  sertraline (ZOLOFT) 50 MG tablet, Take 1 tablet (50 mg) by mouth daily    No current facility-administered medications on file prior to visit.      MEDICATION LIST (after visit)  Current Outpatient Medications   Medication     amLODIPine (NORVASC) 5 MG tablet     naltrexone (DEPADE/REVIA) 50 MG tablet     sertraline (ZOLOFT) 50 MG tablet     No current facility-administered medications for this visit.         No Known Allergies    Additional MDM Details:  none    Penelope Villalobos CNP  [facility]  [facility]  [phone]

## 2022-02-11 NOTE — NURSING NOTE
This video/telephone visit will be conducted via a call between you and your physician/provider. We have found that certain health care needs can be provided without the need for an in-person physical exam. This service lets us provide the care you need with a video /telephone conversation. If a prescription is necessary we can send it directly to your pharmacy. If lab work is needed we can place an order for that and you can then stop by our lab to have the test done at a later time.    Just as we bill insurance for in-person visits, we also bill insurance for video/telephone visits. If you have questions about your insurance coverage, we recommend that you speak with your insurance company.    Patient has given verbal consent for video/Telephone visit? yes    Patient would like video visit, please connect : floridalma  Reason for visit: follow up  Patient verified allergies, medications and pharmacy via phone.     AARON-7 scores:    AARON-7 SCORE 1/24/2022 2/8/2022   Total Score 7 (mild anxiety) 5 (mild anxiety)   Total Score 7 5       PHQ-9 scores:   PHQ-9 SCORE 1/14/2022 2/8/2022   PHQ[facility]9 [facility] (Mild depression) 6 (Mild depression)   PHQ-9 Total Score 7 6       Patient states he is ready for visit. Declined PHQ/AARON today    Mac Grier MA February 11, 2022 3:26 PM

## 2022-03-07 ENCOUNTER — MYC REFILL (OUTPATIENT)
Dept: BEHAVIORAL HEALTH | Facility: CLINIC | Age: 52
End: 2022-03-07
Payer: COMMERCIAL

## 2022-03-07 DIAGNOSIS — F10.10 ALCOHOL ABUSE: ICD-10-CM

## 2022-03-07 RX ORDER — NALTREXONE HYDROCHLORIDE 50 MG/1
50 TABLET, FILM COATED ORAL DAILY
Qty: 50 TABLET | Refills: 0 | Status: SHIPPED | OUTPATIENT
Start: 2022-03-07 | End: 2022-04-19

## 2022-04-19 ENCOUNTER — MYC REFILL (OUTPATIENT)
Dept: BEHAVIORAL HEALTH | Facility: CLINIC | Age: 52
End: 2022-04-19
Payer: COMMERCIAL

## 2022-04-19 DIAGNOSIS — F10.10 ALCOHOL ABUSE: ICD-10-CM

## 2022-04-20 RX ORDER — NALTREXONE HYDROCHLORIDE 50 MG/1
50 TABLET, FILM COATED ORAL DAILY
Qty: 50 TABLET | Refills: 0 | Status: SHIPPED | OUTPATIENT
Start: 2022-04-20 | End: 2022-06-08

## 2022-04-20 NOTE — TELEPHONE ENCOUNTER
Requested Prescriptions   Pending Prescriptions Disp Refills     naltrexone (DEPADE/REVIA) 50 MG tablet 50 tablet 0     Sig: Take 1 tablet (50 mg) by mouth daily Take 1/2 tablet daily for 1 week, then take tablet daily.       There is no refill protocol information for this order        Routing refill request to provider for review/approval because:  Drug not on the [facility] refill protocol     Patient comment: Hello!  I am leaving on vacation this Saturday and need a refill.  Things are going good - still going strong without a drop :)    Thanks,  ANIA Castillo  [facility]

## 2022-05-09 ENCOUNTER — TRANSFERRED RECORDS (OUTPATIENT)
Dept: HEALTH INFORMATION MANAGEMENT | Facility: CLINIC | Age: 52
End: 2022-05-09
Payer: COMMERCIAL

## 2022-05-16 ENCOUNTER — NURSE TRIAGE (OUTPATIENT)
Dept: NURSING | Facility: CLINIC | Age: 52
End: 2022-05-16
Payer: COMMERCIAL

## 2022-05-16 NOTE — TELEPHONE ENCOUNTER
Caller is wife; reports patient had colonoscopy last week with 8 polyps removed; since than has had  Diarrhea and abdominal pain and now passed  BRB in stool   Advised to proceed to ED   Wife understands and will comply   Clair PRIETO       Reason for Disposition    [1] Constant abdominal pain AND [2] present > 2 hours    [1] MODERATE rectal bleeding (small blood clots, passing blood without stool, or toilet water turns red) AND [2] more than once a day    Protocols used: RECTAL BLEEDING-A-AH

## 2022-05-17 ENCOUNTER — OFFICE VISIT (OUTPATIENT)
Dept: FAMILY MEDICINE | Facility: CLINIC | Age: 52
End: 2022-05-17
Payer: COMMERCIAL

## 2022-05-17 VITALS
BODY MASS INDEX: 29.75 KG/M2 | RESPIRATION RATE: 20 BRPM | OXYGEN SATURATION: 97 % | DIASTOLIC BLOOD PRESSURE: 60 MMHG | WEIGHT: 238 LBS | HEART RATE: 77 BPM | SYSTOLIC BLOOD PRESSURE: 118 MMHG | TEMPERATURE: 97.3 F

## 2022-05-17 DIAGNOSIS — R19.7 DIARRHEA OF PRESUMED INFECTIOUS ORIGIN: Primary | ICD-10-CM

## 2022-05-17 DIAGNOSIS — K92.1 BLOOD IN STOOL: ICD-10-CM

## 2022-05-17 LAB — C DIFF TOX B STL QL: NEGATIVE

## 2022-05-17 PROCEDURE — 87493 C DIFF AMPLIFIED PROBE: CPT | Performed by: PHYSICIAN ASSISTANT

## 2022-05-17 PROCEDURE — 99213 OFFICE O/P EST LOW 20 MIN: CPT | Performed by: PHYSICIAN ASSISTANT

## 2022-05-17 PROCEDURE — 87506 IADNA-DNA/RNA PROBE TQ 6-11: CPT | Mod: 59 | Performed by: PHYSICIAN ASSISTANT

## 2022-05-17 RX ORDER — LOPERAMIDE HYDROCHLORIDE 2 MG/1
2 TABLET ORAL 4 TIMES DAILY PRN
Qty: 30 TABLET | Refills: 0 | Status: SHIPPED | OUTPATIENT
Start: 2022-05-17

## 2022-05-17 ASSESSMENT — PAIN SCALES - GENERAL: PAINLEVEL: SEVERE PAIN (6)

## 2022-05-17 NOTE — PROGRESS NOTES
Assessment & Plan     Diarrhea of presumed infectious origin  Blood in stool  Patient had colonoscopy 1 week ago through out side clinic. 9 polyps biopsied with neg pathology. Since the procedure he has been struggling with loose stools and reduced appetite. One episode of BRBPR on 05/16. He was given reassurance about low concern for perforation given normal vitals and exam. He was educated on the importance of bland diet with avoidance of sugars, grease or foods with lots of flavoring/spice. In addition, advised against consumption of soda or alcohol. Patient will complete stool culture to rule out infection. He was given loperamide to use as needed to better manage symptoms. Educational information attached to the AVS. If he does not improve as expected can consider CT abdomen.   - Clostridium difficile Toxin B PCR; Future  - Enteric Bacteria and Virus Panel by GARRY Stool; Future  - Clostridium difficile Toxin B PCR  - Enteric Bacteria and Virus Panel by GARRY Stool      Return in about 8 months (around 1/17/2023) for Return for scheduled annual checkup with PCP.    SYLVIA Nelson [facility]    Reina Loaiza is a 52 year old who presents for the following health issues     History of Present Illness       Reason for visit:  Blood in pancho  Symptom onset:  1-3 days ago  Symptoms include:  Blood in stool upset stomach diarrhea  Symptom intensity:  Moderate  Symptom progression:  Staying the same  Had these symptoms before:  No  What makes it worse:  Food  What makes it better:  Rest bathroom    He eats 0-1 servings of fruits and vegetables daily.He consumes 1 sweetened beverage(s) daily.He exercises with enough effort to increase his heart rate 20 to 29 minutes per day.  He exercises with enough effort to increase his heart rate 3 or less days per week. He is missing 1 dose(s) of medications per week.       Patient is a 52 year old male who presents today with 5 days of  diarrhea, low appetite and fatigue. He informs me that he had his first colonoscopy last week, through the [place] [facility]. He showed me the letter that they sent him which indicated removal of 9 polyps, pathology unremarkable. He says that since the colonoscopy he has been experiencing 3-4 or more loose stools each day. He also says that he has found that anything he eats will trigger a bowel movement shortly after. As such his appetite has been down. On Thursday, 05/12, he noticed that he was more fatigued and slept throughout the afternoon/evening. This is unusual for him. Over the weekend his low appetite persisted as well as the loose stools. He began to feel better by the end of Sunday and went to [employer], but said that he symptoms quickly returned after eating a small amount of food. Yesterday, 05/16, he had a loose, watery stool with BRBPR. This has not occurred with subsequent stools since. Associated symptoms include night sweats, off/on chills and nausea. He says that he has been snacking on candy, soda and similar junk food throughout this week. Denies dizziness/light headedness, fever, abdominal pain, bruising, weakness or pain with bowel movements.     Review of Systems   Constitutional, HEENT, cardiovascular, pulmonary, gi and gu systems are negative, except as otherwise noted.      Objective    /60   Pulse 77   Temp 97.3  F (36.3  C) (Temporal)   Resp 20   Wt 108 kg (238 lb)   SpO2 97%   BMI 29.75 kg/m    Body mass index is 29.75 kg/m .  Physical Exam   GENERAL: healthy, alert and no distress  NECK: no adenopathy, no asymmetry, masses, or scars and thyroid normal to palpation  RESP: lungs clear to auscultation - no rales, rhonchi or wheezes  CV: regular rate and rhythm, normal S1 S2, no S3 or S4, no murmur, click or rub, no peripheral edema and peripheral pulses strong  ABDOMEN: soft, nontender to palpation in all quadrants, no hepatosplenomegaly, no masses and bowel  sounds normal  MS: no gross musculoskeletal defects noted, no edema  PSYCH: mentation appears normal, affect normal/bright

## 2022-06-05 LAB
C COLI+JEJUNI+LARI FUSA STL QL NAA+PROBE: NOT DETECTED
EC STX1 GENE STL QL NAA+PROBE: NOT DETECTED
EC STX2 GENE STL QL NAA+PROBE: NOT DETECTED
NOROV GI+II ORF1-ORF2 JNC STL QL NAA+PR: NOT DETECTED
RVA NSP5 STL QL NAA+PROBE: NOT DETECTED
SALMONELLA SP RPOD STL QL NAA+PROBE: DETECTED
SHIGELLA SP+EIEC IPAH STL QL NAA+PROBE: NOT DETECTED
V CHOL+PARA RFBL+TRKH+TNAA STL QL NAA+PR: NOT DETECTED
Y ENTERO RECN STL QL NAA+PROBE: NOT DETECTED

## 2022-06-08 ENCOUNTER — MYC REFILL (OUTPATIENT)
Dept: BEHAVIORAL HEALTH | Facility: CLINIC | Age: 52
End: 2022-06-08
Payer: COMMERCIAL

## 2022-06-08 DIAGNOSIS — F10.10 ALCOHOL ABUSE: ICD-10-CM

## 2022-06-09 RX ORDER — NALTREXONE HYDROCHLORIDE 50 MG/1
50 TABLET, FILM COATED ORAL DAILY
Qty: 90 TABLET | Refills: 0 | Status: SHIPPED | OUTPATIENT
Start: 2022-06-09 | End: 2022-10-25

## 2022-07-11 DIAGNOSIS — I10 BENIGN ESSENTIAL HYPERTENSION: ICD-10-CM

## 2022-07-11 RX ORDER — AMLODIPINE BESYLATE 5 MG/1
5 TABLET ORAL DAILY
Qty: 90 TABLET | Refills: 1 | Status: SHIPPED | OUTPATIENT
Start: 2022-07-11 | End: 2023-01-04

## 2022-09-11 ENCOUNTER — HEALTH MAINTENANCE LETTER (OUTPATIENT)
Age: 52
End: 2022-09-11

## 2022-10-25 ENCOUNTER — MYC REFILL (OUTPATIENT)
Dept: BEHAVIORAL HEALTH | Facility: CLINIC | Age: 52
End: 2022-10-25

## 2022-10-25 DIAGNOSIS — F10.10 ALCOHOL ABUSE: ICD-10-CM

## 2022-10-26 NOTE — TELEPHONE ENCOUNTER
Patient Comment: Hi Penelope - it's been awhile.  I'd like to re-connect and would also like a refill of 90 tabs if possible.  I've been taking this off and on but I think it's been working when I do take it.  PS - but I am out of Sertraline which I also think is working . . . when I take it, but concerned now since I'm out. Lets connect please - thanks!  Josse    Date of Last Office Visit: 2/11/22  Date of Next Office Visit: NONE -routing to scheduling  No shows since last visit: 0  Cancellations since last visit: 0    Medication requested: naltrexone (DEPADE/REVIA) 50 MG tablet Date last ordered: 6/9/22 Qty: 90 Refills: 0     Review of [place] ?: NA  Lapse in medication adherence greater than 5 days?: Yes   If yes, call patient and gather details: Per patient comment listed above  Medication refill request verified as identical to current order?: yes  Result of Last DAM, VPA, Li+ Level, CBC, or Carbamazepine Level (at or since last visit): N/A    Last visit treatment plan: Alcohol use disorder, severe, in early remission (H)  -Continue Naltrexone 50 mg daily. No refills needed.   Depression, unspecified depression type  -Continue sertraline 50 mg daily. Consider increasing dose to 100 mg if no improvement, or changing to bupropion for its energizing effects.   Sexual behavior disorder  -He is doing well, has look at pornography 3 times in 2 weeks, but really really has no urges.   -Continue Naltrexone.   No orders of the defined types were placed in this encounter    RTC  Return in about 2 weeks (around 2/25/2022), or 3:30pm patient aware, for Follow up, using a video visit, with me.       []Medication refilled per  Medication Refill in Ambulatory Care  policy.  [x]Medication unable to be refilled by RN due to criteria not met as indicated below:    []Eligibility - not seen in the last year   []Supervision - no future appointment   []Compliance - no shows, cancellations or lapse in therapy   []Verification - order  discrepancy   []Controlled medication   [x]Medication not included in policy   []90-day supply request   []Other

## 2022-11-03 RX ORDER — NALTREXONE HYDROCHLORIDE 50 MG/1
50 TABLET, FILM COATED ORAL DAILY
Qty: 90 TABLET | Refills: 0 | Status: SHIPPED | OUTPATIENT
Start: 2022-11-03

## 2023-05-06 ENCOUNTER — HEALTH MAINTENANCE LETTER (OUTPATIENT)
Age: 53
End: 2023-05-06

## 2023-07-10 DIAGNOSIS — I10 BENIGN ESSENTIAL HYPERTENSION: ICD-10-CM

## 2023-07-11 RX ORDER — AMLODIPINE BESYLATE 5 MG/1
5 TABLET ORAL DAILY
Qty: 90 TABLET | Refills: 0 | OUTPATIENT
Start: 2023-07-11

## 2024-07-13 ENCOUNTER — HEALTH MAINTENANCE LETTER (OUTPATIENT)
Age: 54
End: 2024-07-13